# Patient Record
Sex: MALE | Race: BLACK OR AFRICAN AMERICAN | URBAN - METROPOLITAN AREA
[De-identification: names, ages, dates, MRNs, and addresses within clinical notes are randomized per-mention and may not be internally consistent; named-entity substitution may affect disease eponyms.]

---

## 2019-02-26 ENCOUNTER — APPOINTMENT (RX ONLY)
Dept: URBAN - METROPOLITAN AREA CLINIC 63 | Facility: CLINIC | Age: 54
Setting detail: DERMATOLOGY
End: 2019-02-26

## 2019-02-26 DIAGNOSIS — L72.8 OTHER FOLLICULAR CYSTS OF THE SKIN AND SUBCUTANEOUS TISSUE: ICD-10-CM

## 2019-02-26 PROBLEM — E13.9 OTHER SPECIFIED DIABETES MELLITUS WITHOUT COMPLICATIONS: Status: ACTIVE | Noted: 2019-02-26

## 2019-02-26 PROCEDURE — ? COUNSELING

## 2019-02-26 PROCEDURE — ? INCISION AND DRAINAGE

## 2019-02-26 PROCEDURE — 10060 I&D ABSCESS SIMPLE/SINGLE: CPT

## 2019-02-26 ASSESSMENT — LOCATION SIMPLE DESCRIPTION DERM: LOCATION SIMPLE: RIGHT UPPER BACK

## 2019-02-26 ASSESSMENT — LOCATION DETAILED DESCRIPTION DERM: LOCATION DETAILED: RIGHT INFERIOR MEDIAL UPPER BACK

## 2019-02-26 ASSESSMENT — LOCATION ZONE DERM: LOCATION ZONE: TRUNK

## 2019-02-26 NOTE — PROCEDURE: INCISION AND DRAINAGE
Suture Text: The incision was partially closed with
Include Sutures?: No
Wound Care: Petrolatum
Consent was obtained and risks were reviewed including but not limited to delayed wound healing, infection, need for multiple I and D's, and pain.
Epidermal Closure: simple interrupted
Epidermal Sutures: 4-0 Ethilon
Detail Level: Simple
Lesion Type: Cyst
Method: 11 blade
Preparation Text: The area was prepped in the usual clean fashion.
Anesthesia Type: 1% lidocaine with epinephrine
Size Of Lesion In Cm (Optional But May Be Required For Some Insurances): 0
Curette Text (Optional): After the contents were expressed a curette was used to partially remove the cyst wall.
Dressing: dry sterile dressing
Post-Care Instructions: I reviewed with the patient in detail post-care instructions. Patient should keep wound covered and call the office should any redness, pain, swelling or worsening occur.